# Patient Record
Sex: MALE | Race: WHITE | URBAN - METROPOLITAN AREA
[De-identification: names, ages, dates, MRNs, and addresses within clinical notes are randomized per-mention and may not be internally consistent; named-entity substitution may affect disease eponyms.]

---

## 2024-10-15 ENCOUNTER — OFFICE VISIT (OUTPATIENT)
Dept: URGENT CARE | Age: 74
End: 2024-10-15
Payer: MEDICARE

## 2024-10-15 VITALS
DIASTOLIC BLOOD PRESSURE: 72 MMHG | OXYGEN SATURATION: 97 % | RESPIRATION RATE: 16 BRPM | HEART RATE: 76 BPM | SYSTOLIC BLOOD PRESSURE: 135 MMHG

## 2024-10-15 DIAGNOSIS — M54.50 ACUTE RIGHT-SIDED LOW BACK PAIN WITHOUT SCIATICA: Primary | ICD-10-CM

## 2024-10-15 PROCEDURE — 99203 OFFICE O/P NEW LOW 30 MIN: CPT

## 2024-10-15 RX ORDER — CYCLOBENZAPRINE HCL 10 MG
5 TABLET ORAL NIGHTLY PRN
Qty: 10 TABLET | Refills: 0 | Status: SHIPPED | OUTPATIENT
Start: 2024-10-15

## 2024-10-15 NOTE — PROGRESS NOTES
Subjective   Patient ID: Justice Gonzalez is a 74 y.o. male. They present today with a chief complaint of Back Pain.    HISTORY OF PRESENT ILLNESS:    Patient presents to the clinic for right lower back pain x 3 days which began with gradual onset after playing golf. Describes as a dull ache. Denies radiation down legs, tingling, numbness, or gait disturbance. Pt has taken Tylenol for pain, with temporary relief. States the pain is worst in the morning upon waking. Denies blood thinner use, chronic kidney disease, or GI issues.     Past Medical History  Allergies as of 10/15/2024    (No Known Allergies)       Current Outpatient Medications   Medication Instructions    cyclobenzaprine (FLEXERIL) 5 mg, oral, Nightly PRN, May cause drowsiness. Use precautions.         No past medical history on file.    No past surgical history on file.         Review of Systems    Review of Systems   Constitutional:  Negative for fever.   Gastrointestinal:  Negative for vomiting.   Genitourinary:  Negative for dysuria, frequency and hematuria.   Musculoskeletal:  Positive for back pain.   Skin:  Negative for color change and wound.   Neurological:  Negative for weakness and numbness.                                 Objective    Vitals:    10/15/24 1255   BP: 135/72   Pulse: 76   Resp: 16   SpO2: 97%     No LMP for male patient.  PHYSICAL EXAMINATION:    Physical Exam  Vitals and nursing note reviewed.   Constitutional:       General: He is not in acute distress.     Appearance: Normal appearance. He is not ill-appearing.   HENT:      Head: Normocephalic and atraumatic.      Nose: Nose normal.   Eyes:      General:         Right eye: No discharge.         Left eye: No discharge.      Extraocular Movements: Extraocular movements intact.      Conjunctiva/sclera: Conjunctivae normal.   Cardiovascular:      Rate and Rhythm: Normal rate.   Pulmonary:      Effort: Pulmonary effort is normal. No respiratory distress.   Abdominal:       Tenderness: There is no right CVA tenderness or left CVA tenderness.   Musculoskeletal:      Cervical back: Normal range of motion and neck supple.      Lumbar back: No edema, deformity or signs of trauma.        Back:    Skin:     General: Skin is warm and dry.   Neurological:      General: No focal deficit present.      Mental Status: He is alert and oriented to person, place, and time.      Gait: Gait normal.   Psychiatric:         Mood and Affect: Mood normal.         Behavior: Behavior normal.          Procedures    No results found for this visit on 10/15/24.    Diagnostic study results (if any) were reviewed by Daysi Donnelly PA-C.    Assessment/Plan   Allergies, medications, history, and pertinent labs/EKGs/Imaging reviewed by Daysi Donnelly PA-C.     Orders and Diagnoses  Diagnoses and all orders for this visit:  Acute right-sided low back pain without sciatica  -     cyclobenzaprine (Flexeril) 10 mg tablet; Take 0.5 tablets (5 mg) by mouth as needed at bedtime for muscle spasms. May cause drowsiness. Use precautions.      Medical Admin Record    Given overall well appearance, vital signs, history, and exam as above, there is no indication for further emergent testing/intervention at this time.      I discussed with the patient my clinical thoughts at this time given the above and we had a shared decision-making conversation in a patient-centered decision-making model on how to proceed forward. Pt was instructed on the importance of close follow-up. They were told that an urgent care diagnosis is often a preliminary impression and that definitive care is often not able to be given in the urgent care setting. Pt was educated that close follow-up is essential for good health and good outcomes. Patient was provided with the following instructions:         Recommend ibuprofen 600 mg every 8 hours for pain, as needed, for up to 7-10 days.    I sent a muscle relaxant to the pharmacy. You may take this at night  if needed for additional relief. This can cause drowsiness.    Heat, gentle stretches, rest, fluids. Avoid heavy lifting and strenuous activity for 7 days.    Follow up with your primary care doctor if your symptoms do not improve after 7-10 days.    Go to the ED for new or worsening symptoms.         Clinical impression as well as limitations of available testing/examination, all discussed with patient. Pt is well at this time in the urgent care. They are comfortable with the present assessment and plan to be discharged home. Discussed with them close outpatient follow up, reassessment, and possible further testing/treatment via their PCP/specialist if symptoms fail to improve; they agree, understand, and are comfortable with this plan. Pt given the opportunity to ask questions prior to discharge and all questions were answered at this time. Via our discussion, the patient was advised of warning signs and instructions were reviewed. Strict ED precautions were given, acknowledged, and understood. Discussed with the patient/family that it is okay to return to the urgent care at any time for anything. Advised to present to the ED if present condition changes/worsens or if they develop new symptoms at any time after discharge. Also, advised to go to the ED if they cannot establish outpatient follow-up in time frame specified above. Pt verbalized understanding and agreement with plan and instructions. Discussed the need for close follow up with their primary care provider and/or specialist for further testing/treatment/care/consultation in the short time frame as noted above, if needed - they understand these instructions and agree to close follow up for these reasons. Patient discharged home in stable condition.      Follow Up Instructions  No follow-ups on file.    Patient disposition: Home    Electronically signed by Daysi Donnelly PA-C  1:46 PM

## 2024-10-16 ASSESSMENT — ENCOUNTER SYMPTOMS
WEAKNESS: 0
BACK PAIN: 1
FEVER: 0
FREQUENCY: 0
DYSURIA: 0
NUMBNESS: 0
WOUND: 0
COLOR CHANGE: 0
VOMITING: 0
HEMATURIA: 0